# Patient Record
Sex: FEMALE
[De-identification: names, ages, dates, MRNs, and addresses within clinical notes are randomized per-mention and may not be internally consistent; named-entity substitution may affect disease eponyms.]

---

## 2018-07-31 NOTE — HISTORY AND PHYSICAL REPORT
History of Present Illness


Date of examination: 18


Date of admission: 





2018


Chief complaint: 





here for c/s


History of present illness: 


Pt presents for scheduled repeat c/s. All risk, benefits, and alternatives were 

d/w pt and questions were addressed and answered.


EDC Confirmation:  2018


Gestational Age:  12 1/7 weeks





Past Pregnancy History 


   :      2


   Term Births:      1


   Living Children:   1


   Para:         1


   Prev :   1


   Aborta:      0





Pregnancy # 1


   Delivery date:     2017


   Weeks Gestation:   40


    labor:     no


   Delivery type:     


   Hours of labor:     72h


   Delivery location:     [facility] [place]


   Infant Sex:      Female


   Birth weight:      8#11


   Comments:      3 day IOL, c/s 8cm for FTP








Past Medical History:


   Negative Past Medical History





Past Surgical History:


   





Past Medical History 


Surgery (Non-gyn): 





Abnormal PAP: negative


CHILANGO Exposure: negative


Infertility: negative


Uterine Anomaly: negative


Uterine Surgery (not C/S): negative


Other Gynecologic Problems: negative





Family Hx: MGM - bipolar,  of haemochromatosis





Social Hx: , works as teacher


no ETOH/Drugs/Smoking





Infection History 


Hx of STD: none


HIV Risk Eval: no


Hepatitis B Risk Eval: low risk


Personal hx. of genital herpes: no


Partner hx. of genital herpes: no


Rash, Viral, or Febrile illness since last LMP? no


Varicella/Chicken Pox Status: Previous Disease





Genetic History 


 Congenital Heart Defect:


    Mom: no  Dad: no


Canavan Disease:


    Mom: no  Dad: no


Thalassemia


    Mom: no  Dad: no


Neural Tube Defect


    Mom: no  Dad: no


Down's Syndrome


    Mom: no  Dad: no


Morteza-Sachs


    Mom: no  Dad: no


Sickle Cell Disease/Trait


    Mom: no  Dad: no


Hemophilia


    Mom: no  Dad: no


Muscular Dystrophy


    Mom: no  Dad: no


Cystic Fibrosis


    Mom: no  Dad: no


Diana Chorea


    Mom: no  Dad: no


Mental Retardation


    Mom: no  Dad: no


Fragile X


    Mom: no  Dad: no


Other Genetic/Chromosomal Disorder


    Mom: no  Dad: no


Child w/other birth defect


    Mom: no  Dad: no





Enviromental Exposures 


Xray Exposure: no


Medication, drug, or alcohol use since LMP: no


Chemical/Other Exposure: no


Exposure to Cat Liter: no


Hx of Parvovirus (Fifth Disease): no


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


Active Medications (reviewed today):


None





Current Allergies (reviewed today):


No known allergies





Past History


Past Medical History: no pertinent history


Past Surgical History:  section


GYN History: denies: abnormal PAP smear


Social history: no significant social history





- Obstetrical History


Expected Date of Delivery: 18


Actual Gestation: 38 Week(s) 6 Day(s) 


: 2


Para: 1


Number of Living Children: 1





Review of Systems


All systems: negative





- Physical Exam


Cardiovascular: Normal S1, Normal S2


Lungs: Positive: Clear to auscultation, Normal air movement


Abdomen: Positive: normal appearance, soft, normal bowel sounds.  Negative: 

distention, tenderness, guarding


Genitourinary (Female): Positive: normal external genitalia, normal perenium


Extremities: Positive: normal.  Negative: tenderness, edema


Deep Tendon Reflex Grade: Normal +2





Results


All other labs normal.








Assessment and Plan





- Patient Problems


(1) 39 weeks gestation of pregnancy


Status: Acute   





(2) Previous  section


Status: Acute   


Plan to address problem: 


-admit


-prepare for scheduled repeat c/s

## 2018-08-01 ENCOUNTER — HOSPITAL ENCOUNTER (INPATIENT)
Dept: HOSPITAL 5 - LD | Age: 28
LOS: 2 days | Discharge: HOME | End: 2018-08-03
Attending: OBSTETRICS & GYNECOLOGY | Admitting: OBSTETRICS & GYNECOLOGY
Payer: MEDICAID

## 2018-08-01 DIAGNOSIS — O34.211: Primary | ICD-10-CM

## 2018-08-01 DIAGNOSIS — Z3A.39: ICD-10-CM

## 2018-08-01 DIAGNOSIS — O34.13: ICD-10-CM

## 2018-08-01 DIAGNOSIS — Z81.8: ICD-10-CM

## 2018-08-01 DIAGNOSIS — D25.9: ICD-10-CM

## 2018-08-01 DIAGNOSIS — Z23: ICD-10-CM

## 2018-08-01 DIAGNOSIS — Z84.89: ICD-10-CM

## 2018-08-01 LAB
BASOPHILS # (AUTO): 0 K/MM3 (ref 0–0.1)
BASOPHILS NFR BLD AUTO: 0.4 % (ref 0–1.8)
EOSINOPHIL # BLD AUTO: 0.1 K/MM3 (ref 0–0.4)
EOSINOPHIL NFR BLD AUTO: 0.8 % (ref 0–4.3)
HCT VFR BLD CALC: 34.9 % (ref 30.3–42.9)
HGB BLD-MCNC: 11.1 GM/DL (ref 10.1–14.3)
LYMPHOCYTES # BLD AUTO: 2.8 K/MM3 (ref 1.2–5.4)
LYMPHOCYTES NFR BLD AUTO: 24.2 % (ref 13.4–35)
MCH RBC QN AUTO: 24 PG (ref 28–32)
MCHC RBC AUTO-ENTMCNC: 32 % (ref 30–34)
MCV RBC AUTO: 75 FL (ref 79–97)
MONOCYTES # (AUTO): 1.1 K/MM3 (ref 0–0.8)
MONOCYTES % (AUTO): 9.5 % (ref 0–7.3)
PLATELET # BLD: 239 K/MM3 (ref 140–440)
RBC # BLD AUTO: 4.63 M/MM3 (ref 3.65–5.03)

## 2018-08-01 PROCEDURE — 86850 RBC ANTIBODY SCREEN: CPT

## 2018-08-01 PROCEDURE — C9250 ARTISS FIBRIN SEALANT: HCPCS

## 2018-08-01 PROCEDURE — 86900 BLOOD TYPING SEROLOGIC ABO: CPT

## 2018-08-01 PROCEDURE — 85025 COMPLETE CBC W/AUTO DIFF WBC: CPT

## 2018-08-01 PROCEDURE — 85014 HEMATOCRIT: CPT

## 2018-08-01 PROCEDURE — 86901 BLOOD TYPING SEROLOGIC RH(D): CPT

## 2018-08-01 PROCEDURE — G0463 HOSPITAL OUTPT CLINIC VISIT: HCPCS

## 2018-08-01 PROCEDURE — 85018 HEMOGLOBIN: CPT

## 2018-08-01 PROCEDURE — 36415 COLL VENOUS BLD VENIPUNCTURE: CPT

## 2018-08-01 PROCEDURE — 99211 OFF/OP EST MAY X REQ PHY/QHP: CPT

## 2018-08-01 RX ADMIN — CEFAZOLIN SCH MLS/HR: 330 INJECTION, POWDER, FOR SOLUTION INTRAMUSCULAR; INTRAVENOUS at 20:30

## 2018-08-01 NOTE — EVENT NOTE
Date: 08/01/18





While getting IV started in triage pt had a vagal response as per RN this was 

follow by fetal decel to the 60s to 90s for 4 1/2 minutes. Position change and 

Ox with IV fluid bolusing has corrected the deceleration. Currently tracing is 

CAT I and reactive. Will proceed with scheduled c/s. Blood work being sent at 

this time. Provider at bedside at time of resoultion of the deceleration. Pt 

and s/o states that she normally has a panic with the getting blood drawn. 

Providers were not aware of this . Pt currently A&O x 4 and is signing 

consents. All questions were addressed and answered.

## 2018-08-01 NOTE — OPERATIVE REPORT
Operative Report


Operative Report: 


Date of procedure: 2018





Pre-operative diagnosis: 39 weeks gestation


Previous 





Post-operative diagnosis: 





Procedure name(s): Repeat low transverse  section via Pfannenstiel skin 

incision





Surgeon: Dr. Westfall





Assistant: RAY





Anesthesia: Combined spinal epidural





EBL: 800 mL





Urine output: 100 mL of clear urine out at the end of procedure





Fluids: 1400 mL





Findings: Liveborn female infant weight 7 lbs. 15 oz. Apgars of 8 and 9 at one 

and 5 minutes


            True knot in umbilical cord


            Grossly normal fallopian tubes and ovaries bilaterally


            Normal uterus


            Approximately 1 cm anterior fibroid noted





Indications: Patient presents for repeat  section.  All risks benefits 

and alternatives were discussed with the patient.  Consents were signed and 

placed on the chart.





Procedure: Patient was taking to the operating room.  Patient was then prepped 

and draped in sterile fashion after anesthesia was found to be adequate.  A low 

transverse skin incision was made with the scalpel through previous incisional 

scar and carried down to the underlying layer of fascia with the Bovie.  The 

fascia was then incised in the midline and this incision was extended 

bilaterally with the Bovie.  The superior aspect of the fascia was grasped with 

Kocher clamps tented upward and dissected off of the anterior rectus muscles 

with the scalpel.  In similar fashion the inferior aspect of the fascia was 

grasped with Kocher clamps tented upward and dissected off of the anterior 

rectus muscles.  The rectus muscles were then sharply divided in the midline.  

The peritoneum was identified and entered into sharply.  The Arturo retractor 

was placed.  The bladder blade was placed.  A lower transverse uterine incision 

was made with the scalpel and extended bilaterally with the bandage scissors.  

Artificial rupture of membranes was performed yielding clear amniotic fluid.  

The infant's head was then delivered atraumatically.  The anterior shoulder and 

rest of infant delivered without difficulty.  The umbilical cord was clamped 

x2.  The cord was cut.  The infant was then placed in sterile bassinet.  The 

cord blood was collected.  The placenta was manually extracted in its entirety.

  The uterus was exteriorized and cleared of all clots and debris.  The uterine 

incision was closed using 0 Vicryl in a running locking fashion.  Then 0 Vicryl 

several figure-of-eight sutures were used along the incision line to secure 

excellent hemostasis.  The posterior cul-de-sac was copiously irrigated.  The 

uterus was returned to the abdomen.  The gutters were also irrigated.  The 

Arturo retractor was removed from the abdomen The anterior rectus muscles were 

reapproximated using 3-0 Vicryl.  The anterior rectus fascia was reapproximated 

using 0 Vicryl in a running fashion.  The subcuticular fat was reapproximated 

using 2-0 Vicryl in a running fashion.  The skin was reapproximated with 4-0 

Monocryl in a subcuticular stitch.  The patient tolerated the procedure well.  

Sponge lap and needle counts were all correct x3.  Patient was taken to the 

recovery room awake and in stable condition.

## 2018-08-01 NOTE — ANESTHESIA CONSULTATION
Anesthesia Consult and Med Hx


Date of service: 08/01/18





- Airway


Anesthetic Teeth Evaluation: Good


ROM Head & Neck: Adequate


Mental/Hyoid Distance: Adequate


Mallampati Class: Class II


Intubation Access Assessment: Probably Good





- Pre-Operative Health Status


ASA Pre-Surgery Classification: ASA2


Proposed Anesthetic Plan: Epidural, Spinal

## 2018-08-02 LAB
HCT VFR BLD CALC: 32.4 % (ref 30.3–42.9)
HGB BLD-MCNC: 10.4 GM/DL (ref 10.1–14.3)

## 2018-08-02 PROCEDURE — 3E0234Z INTRODUCTION OF SERUM, TOXOID AND VACCINE INTO MUSCLE, PERCUTANEOUS APPROACH: ICD-10-PCS | Performed by: OBSTETRICS & GYNECOLOGY

## 2018-08-02 RX ADMIN — IBUPROFEN PRN MG: 800 TABLET, FILM COATED ORAL at 13:41

## 2018-08-02 RX ADMIN — IBUPROFEN PRN MG: 800 TABLET, FILM COATED ORAL at 19:52

## 2018-08-02 RX ADMIN — CEFAZOLIN SCH MLS/HR: 330 INJECTION, POWDER, FOR SOLUTION INTRAMUSCULAR; INTRAVENOUS at 05:00

## 2018-08-02 NOTE — PROGRESS NOTE
Assessment and Plan





- Patient Problems


(1)  delivery delivered


Onset Date: ~18   Current Visit: Yes   Status: Acute   


Plan to address problem: 


Pt awake Getting OOB to toilet. No c/o voiced VSS FF below umb Lochia small 

Dressing D&I to be removed H&H 10/32 stable No s/sx of anemia Doing well s/p 

 section. P: continue pathway Advance diet and activity as tolerated. 








Subjective





- Subjective


Date of service: 18 (Pt awake w/o complaint)


Principal diagnosis: Day # 1 s/ repeat c/s


Patient reports: voiding normally, pain well controlled, ambulating normally


[place]: doing well





Objective





- Vital Signs


Latest vital signs: 


 Vital Signs











  Temp Pulse Resp BP BP Pulse Ox


 


 18 04:21  98.4 F  88  18   110/71 


 


 18 00:21  98.3 F  96 H  16   113/73 


 


 18 20:00  99 F  95 H  18   113/73 


 


 18 16:30  98.9 F  109 H  20  128/77   94


 


 18 14:05   80  12  135/64   99


 


 18 13:55   87  11 L  125/52   100


 


 18 13:41   86  18  128/63   100


 


 18 13:15   88  15  121/66   98


 


 18 13:10   89  14  124/64   98


 


 18 13:05   85  14  120/63   97


 


 18 13:00   91 H  16  120/61   97


 


 18 12:57  97.6 F  97 H  16  106/51   97


 


 18 11:20   97 H   119/63   97


 


 18 11:16   93 H   114/66  


 


 18 11:15   94 H     100


 


 18 11:10   89     100


 


 18 11:09   98 H   120/62  


 


 18 11:05   80     100


 


 18 11:04   86   116/60  


 


 18 11:00   84     100


 


 18 10:55   88     99


 


 18 10:54   92 H   101/67  


 


 18 10:50   76     99


 


 18 10:49   89   92/55  


 


 18 10:45   90     99


 


 18 10:44   85   102/60  


 


 18 10:40   92 H     100


 


 18 10:39   85   105/66  


 


 18 10:35   79     99


 


 18 10:34   76   109/67  


 


 18 10:30   77     93


 


 18 10:29   74     95


 


 18 10:17   116 H   118/65  








 Intake and Output











 18





 14:59 22:59 06:59


 


Intake Total 2200 570 480


 


Output Total 300 1600 1200


 


Balance 1900 -1030 -720


 


Intake:   


 


  IV 2200 50 


 


    ANCEF/NS 1 GM/50 ML 1 gm  50 





    In 50 ml @ 100 mls/hr IV   





    Q8H [facility] Rx#:[id number]   


 


  Oral  520 


 


  Intake, Free Water   480


 


Output:   


 


  Urine 300 1600 1200


 


    Indwelling Catheter  1600 1200


 


    Uretheral ([place]) 100  


 


Other:   


 


  Total, Intake Amount  520 


 


  Total, Output Amount  1600 1200


 


  Weight 230 lb  








 Patient Weight











 18





 06:59


 


Weight 230 lb














- Exam


Breasts: Present: normal


Cardiovascular: Present: Regular rate


Lungs: Present: Normal air movement


Abdomen: Present: normal appearance, soft


Uterus: Present: normal, firm, fundal height below umbilicus


Extremities: Present: normal


Deep Tendon Reflex Grade: Normal +2


Incision: Present: normal, dry, intact, dressed





- Labs


Labs: 


 Abnormal lab results











  18 Range/Units





  10:40 


 


WBC  11.6 H  (4.5-11.0)  K/mm3


 


MCV  75 L  (79-97)  fl


 


MCH  24 L  (28-32)  pg


 


RDW  16.7 H  (13.2-15.2)  %


 


Mono % (Auto)  9.5 H  (0.0-7.3)  %


 


Mono #  1.1 H  (0.0-0.8)  K/mm3

## 2018-08-03 VITALS — DIASTOLIC BLOOD PRESSURE: 87 MMHG | SYSTOLIC BLOOD PRESSURE: 134 MMHG

## 2018-08-03 RX ADMIN — IBUPROFEN PRN MG: 800 TABLET, FILM COATED ORAL at 06:30

## 2024-01-18 NOTE — DISCHARGE SUMMARY
Providers





- Providers


Date of Admission: 


18 09:44





Date of discharge: 18 (desires d/c home today)


Attending physician: 


ROSALINDA SMALL





Primary care physician: 


ROSALINDA SMALL








Hospitalization


Reason for admission: repeat c/s


Condition: Good


Pertinent studies: 


 postop H&H 10.4/32.4





Procedures: 


repeat c/s





Hospital course: 


 uncomplicated c/s and postpartum course





Disposition: DC- TO HOME OR SELFCARE





- Discharge Diagnoses


(1)  delivery delivered


Status: Acute   





Core Measure Documentation





- Palliative Care


Palliative Care/ Comfort Measures: Not Applicable





- Core Measures


Any of the following diagnoses?: none





Exam





- Constitutional


Vitals: 


 











Temp Pulse Resp BP Pulse Ox


 


 98.3 F   96 H  18   115/68   96 


 


 18 23:55  18 23:55  18 23:55  18 23:55  18 17:13











General appearance: Present: no acute distress, well-nourished





- EENT


Eyes: Present: PERRL


ENT: hearing intact, clear oral mucosa





- Neck


Neck: Present: supple, normal ROM





- Respiratory


Respiratory effort: normal


Respiratory: bilateral: CTA





- Cardiovascular


Heart Sounds: Present: S1 & S2.  Absent: rub, click





- Extremities


Extremities: pulses symmetrical, No edema


Peripheral Pulses: within normal limits





- Abdominal


General gastrointestinal: Present: soft, non-tender, non-distended, normal 

bowel sounds


Female genitourinary: Present: normal





- Integumentary


Integumentary: Present: clear, warm, dry





- Musculoskeletal


Musculoskeletal: gait normal, strength equal bilaterally





- Psychiatric


Psychiatric: appropriate mood/affect, intact judgment & insight





- Neurologic


Neurologic: CNII-XII intact, moves all extremities





- Additional findings


Additional findings: 


 Lochia scant, incision D&I, fundus firm, VSSAF, H&H stable








Plan


Activity: no restrictions


Diet: regular


Wound: open to air, keep clean and dry


Follow up with: 


ROSALINDA SMALL MD [Primary Care Provider] - 7 Days (Congratulations!  Please 

call [phone] to schedule your incision check in one week and your 

postpartum visit in 4 weeks. Call for any questions or concerns. ) never